# Patient Record
Sex: MALE | Race: BLACK OR AFRICAN AMERICAN | NOT HISPANIC OR LATINO | URBAN - METROPOLITAN AREA
[De-identification: names, ages, dates, MRNs, and addresses within clinical notes are randomized per-mention and may not be internally consistent; named-entity substitution may affect disease eponyms.]

---

## 2019-02-16 ENCOUNTER — EMERGENCY (EMERGENCY)
Facility: HOSPITAL | Age: 34
LOS: 0 days | Discharge: HOME | End: 2019-02-16
Attending: EMERGENCY MEDICINE | Admitting: EMERGENCY MEDICINE

## 2019-02-16 VITALS
TEMPERATURE: 98 F | RESPIRATION RATE: 18 BRPM | SYSTOLIC BLOOD PRESSURE: 154 MMHG | HEART RATE: 92 BPM | DIASTOLIC BLOOD PRESSURE: 84 MMHG | OXYGEN SATURATION: 100 %
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
## 2019-02-16 DIAGNOSIS — Z91.013 ALLERGY TO SEAFOOD: ICD-10-CM

## 2019-02-16 DIAGNOSIS — K13.0 DISEASES OF LIPS: ICD-10-CM

## 2019-02-16 DIAGNOSIS — K00.0 ANODONTIA: ICD-10-CM
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
## 2019-02-16 DIAGNOSIS — S01.511A LACERATION WITHOUT FOREIGN BODY OF LIP, INITIAL ENCOUNTER: ICD-10-CM

## 2019-02-16 DIAGNOSIS — K02.9 DENTAL CARIES, UNSPECIFIED: ICD-10-CM

## 2019-02-16 RX ORDER — ACETAMINOPHEN 500 MG
975 TABLET ORAL ONCE
Qty: 0 | Refills: 0 | Status: COMPLETED | OUTPATIENT
Start: 2019-02-16 | End: 2019-02-16

## 2019-02-16 RX ORDER — CEPHALEXIN 500 MG
1 CAPSULE ORAL
Qty: 28 | Refills: 0 | OUTPATIENT
Start: 2019-02-16 | End: 2019-02-22

## 2019-02-16 RX ADMIN — Medication 975 MILLIGRAM(S): at 16:05

## 2019-02-16 NOTE — ED PROVIDER NOTE - TOOTH FINDINGS
mobile tooth 8; missing tooth 9; lip swelling with hematoma; gaping lip laceration/DENTAL INJURY/MALOCCLUSION/DENTAL CARIES

## 2019-02-16 NOTE — ED PROCEDURE NOTE - NS_EDPROVIDERDISPOUSERTYPE_ED_A_ED
I have personally evaluated and examined the patient. The Attending was available to me as a supervising provider if needed.
present

## 2019-02-16 NOTE — ED ADULT TRIAGE NOTE - CHIEF COMPLAINT QUOTE
patient with swelling and open wound to the lower lip. Patient states he woke up with this this morning, denies any trauma to the area.

## 2019-02-16 NOTE — ED PROVIDER NOTE - NSFOLLOWUPINSTRUCTIONS_ED_ALL_ED_FT

## 2019-02-16 NOTE — ED ADULT NURSE NOTE - OBJECTIVE STATEMENT
pt 32 y/o male presents to ED c/o lip pain and swelling with open wound as per pt he went to bed fine and woke up like this. as per pt he has allergy to fish and did eat shrimp last night. pt c/o pain 8/10, pt denies any medical history

## 2019-02-16 NOTE — ED PROVIDER NOTE - ATTENDING CONTRIBUTION TO CARE
34 yo  male  with lower lip laceration , headache and  contusion of the chin noted this morning,. Patient allegedly unaware how this happened, says there was blood on the floor near his bed.  Denies any additional complaints such as tongue biting, urinary incontinence, ect.  Well-appearing, NAD, exam as noted , no midline spine ttp, no focal neuro deficits.  tetunus is UTD.  Will repair lac and obtain imaging studies, anticipate d/c home.  patient and father are amenable with the plan.

## 2019-02-16 NOTE — ED PROVIDER NOTE - NSFOLLOWUPCLINICS_GEN_ALL_ED_FT
[facility]  Dental  [address] [place] [address]  Phone: [phone]  Fax:   Follow Up Time:     [facility]  Medicine  [address], [place]   Phone: [phone]  Fax:   Follow Up Time:

## 2019-02-16 NOTE — ED PROVIDER NOTE - OBJECTIVE STATEMENT
32 y/o male presents to the ED accompanied by father for evaluation of lip/ chin swelling and laceration lower lip. Patient denies hx trauma. States there was blood on floor in room this morning. +mild HA. no tongue swelling/ difficulty swallowing/ weakness/ rash/ loss of urine or stool/ tongue laceration. Tetanus UTD.

## 2019-04-23 ENCOUNTER — OUTPATIENT (OUTPATIENT)
Dept: OUTPATIENT SERVICES | Facility: HOSPITAL | Age: 34
LOS: 1 days | Discharge: HOME | End: 2019-04-23

## 2019-07-16 ENCOUNTER — OUTPATIENT (OUTPATIENT)
Dept: OUTPATIENT SERVICES | Facility: HOSPITAL | Age: 34
LOS: 1 days | Discharge: HOME | End: 2019-07-16

## 2019-07-16 DIAGNOSIS — Z01.21 ENCOUNTER FOR DENTAL EXAMINATION AND CLEANING WITH ABNORMAL FINDINGS: ICD-10-CM

## 2023-11-01 NOTE — ED PROVIDER NOTE - EYES NEGATIVE STATEMENT, MLM
Bed: 10  Expected date:   Expected time:   Means of arrival:   Comments:  Reading/ fall  
Pt departs from ER in stable condition at this time.  
Pt given sandwich and PO fluids as requested. Call light within reach. Bed in low-locked position.  
Pt to ct w/ tech via tracy at this time.  
no discharge, no irritation, no pain, no redness, and no visual changes.

## 2025-01-06 NOTE — ED ADULT TRIAGE NOTE - BP NONINVASIVE SYSTOLIC (MM HG)
Refill Routing Note   Medication(s) are not appropriate for processing by [facility] for the following reason(s):        Patient not seen by provider within 15 months    [facility] action(s):  Defer               Appointments  past 12m or future 3m with PCP    Date Provider   Last Visit   12/14/2022 Shena Rodriguez MD   Next Visit   Visit date not found Shena Rodriguez MD   ED visits in past 90 days: 0        Note composed:7:02 AM 01/06/2025                 154